# Patient Record
Sex: MALE | Race: WHITE | Employment: FULL TIME | URBAN - METROPOLITAN AREA
[De-identification: names, ages, dates, MRNs, and addresses within clinical notes are randomized per-mention and may not be internally consistent; named-entity substitution may affect disease eponyms.]

---

## 2017-12-14 ENCOUNTER — TELEPHONE (OUTPATIENT)
Dept: FAMILY MEDICINE | Facility: CLINIC | Age: 52
End: 2017-12-14

## 2017-12-14 NOTE — TELEPHONE ENCOUNTER
12/14/2017    Call Regarding Preventive Health Screening Colonoscopy    Attempt 1    Message on voicemail     Comments:       Outreach   MG

## 2018-01-12 NOTE — TELEPHONE ENCOUNTER
1/12/2018    Call Regarding Preventive Health Screening Colonoscopy    Attempt 2    Message on voicemail     Comments:       Outreach   SB

## 2018-02-10 NOTE — TELEPHONE ENCOUNTER
2/10/2018    Call Regarding Preventive Health Screening Colonoscopy    Attempt 3    Message on voicemail     Comments:       Outreach   SB

## 2018-06-15 ENCOUNTER — OFFICE VISIT (OUTPATIENT)
Dept: FAMILY MEDICINE | Facility: CLINIC | Age: 53
End: 2018-06-15
Payer: COMMERCIAL

## 2018-06-15 VITALS
HEIGHT: 70 IN | BODY MASS INDEX: 29.2 KG/M2 | SYSTOLIC BLOOD PRESSURE: 132 MMHG | RESPIRATION RATE: 14 BRPM | OXYGEN SATURATION: 98 % | DIASTOLIC BLOOD PRESSURE: 84 MMHG | HEART RATE: 76 BPM | WEIGHT: 204 LBS | TEMPERATURE: 98.7 F
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
## 2018-06-15 PROCEDURE — 99213 OFFICE O/P EST LOW 20 MIN: CPT | Performed by: FAMILY MEDICINE

## 2018-06-15 NOTE — MR AVS SNAPSHOT
"              After Visit Summary   6/15/2018    Lb Cevallos    MRN: [id number]           Patient Information     Date Of Birth          1965        Visit Information        Provider Department      6/15/2018 2:00 PM Noble Jarrett MD [facility] [place]        Today's Diagnoses     Bug bite, initial encounter    -  1       Follow-ups after your visit        Follow-up notes from your care team     Return if symptoms worsen or fail to improve.      Who to contact     If you have questions or need follow up information about today's clinic visit or your schedule please contact [facility] [place] directly at [phone].  Normal or non-critical lab and imaging results will be communicated to you by MyChart, letter or phone within 4 business days after the clinic has received the results. If you do not hear from us within 7 days, please contact the clinic through MyChart or phone. If you have a critical or abnormal lab result, we will notify you by phone as soon as possible.  Submit refill requests through [employer] or call your pharmacy and they will forward the refill request to us. Please allow 3 business days for your refill to be completed.          Additional Information About Your Visit        MyChart Information     [employer] gives you secure access to your electronic health record. If you see a primary care provider, you can also send messages to your care team and make appointments. If you have questions, please call your primary care clinic.  If you do not have a primary care provider, please call [phone] and they will assist you.        Care EveryWhere ID     This is your Care EveryWhere ID. This could be used by other organizations to access your [facility] medical records  [id number]        Your Vitals Were     Pulse Temperature Respirations Height Pulse Oximetry BMI (Body Mass Index)    76 98.7  F (37.1  C) (Tympanic) 14 5' 10\" (1.778 m) 98% 29.27 kg/m2       Blood " Pressure from Last 3 Encounters:   06/15/18 132/84   12/19/16 128/90   03/04/16 131/87    Weight from Last 3 Encounters:   06/15/18 204 lb (92.5 kg)   12/19/16 215 lb (97.5 kg)   03/04/16 206 lb (93.4 kg)              Today, you had the following     No orders found for display       Primary Care Provider Office Phone # Fax #    [facility] [phone]       [address] [facility] [address]        Equal Access to Services     PAUL GREENE : Hadii isaías hernandez hadasho Soomaali, waaxda luqadaha, qaybta kaalmada aderosemarieyahardik, zoraida chester . So [facility] [phone].    ATENCIÓN: Si habla español, tiene a vasquez disposición servicios gratuitos de asistencia lingüística. Llame al [phone].    We comply with applicable federal civil rights laws and [place] laws. We do not discriminate on the basis of race, color, national origin, age, disability, sex, sexual orientation, or gender identity.            Thank you!     Thank you for choosing [facility]  for your care. Our goal is always to provide you with excellent care. Hearing back from our patients is one way we can continue to improve our services. Please take a few minutes to complete the written survey that you may receive in the mail after your visit with us. Thank you!             Your Updated Medication List - Protect others around you: Learn how to safely use, store and throw away your medicines at www.disposemymeds.org.      Notice  As of 6/15/2018  2:30 PM    You have not been prescribed any medications.

## 2018-06-15 NOTE — Clinical Note
Colonoscopy done on this date: 9/1/2016 (approximately), by this group: [facility], results were normal.

## 2018-06-15 NOTE — PROGRESS NOTES
SUBJECTIVE:   Lb Cevallos is a 52 year old male who presents to clinic today for the following health issues:      Derm Problem       Duration: 6 days     Description  Location: abdomen   Itching: not currently, it was itchy at fist     Intensity:  mild    Accompanying signs and symptoms: None    History (similar episodes/previous evaluation): None    Precipitating or alleviating factors:  New exposures:  None  Recent travel: no      Therapies tried and outcome: none    Please abstract the following data from this visit with this patient into the appropriate field in Epic:    Colonoscopy done on this date: 9/1/2016 (approximately), by this group: [facility], results were normal.         Problem list and histories reviewed & adjusted, as indicated.  Additional history: as documented    Patient Active Problem List   Diagnosis     Family history of malignant neoplasm of prostate     Kidney donor     Hyperlipidemia LDL goal <130     Obesity (BMI 30-39.9)     High triglycerides     Elevated fasting glucose     Elevated hemoglobin (H)     Past Surgical History:   Procedure Laterality Date     HC REPAIR SLIDING INGUINAL HERNIA  2003,1983    Bilateral     HC VASECTOMY UNILAT/BILAT W POSTOP SEMEN  2000     SURGICAL HISTORY OF -   6/07    donated left kidney, done with laparoscopic surg ([place])       Social History   Substance Use Topics     Smoking status: Never Smoker     Smokeless tobacco: Never Used     Alcohol use 0.0 oz/week     0 Standard drinks or equivalent per week      Comment: 1-2 drinks per year     Family History   Problem Relation Age of Onset     Blood Disease Father      leukemia     Thyroid Disease Mother      Prostate Cancer Father      early 70s     Cancer - colorectal No family hx of      Breast Cancer No family hx of      C.A.D. Maternal Grandfather      ANDREZ MENDOZA     DIABETES No family hx of      CEREBROVASCULAR DISEASE No family hx of          No current outpatient prescriptions on file.  "    No Known Allergies  Recent Labs   Lab Test  05/07/15   1441  04/26/13   0914  10/04/12   1154  09/29/10   1208   09/29/10   1207   A1C   --   5.8   --   5.6   --    --    LDL   --   Cannot estimate LDL when triglyceride exceeds 400 mg/dL  126  Cannot estimate LDL when triglyceride exceeds 400 mg/dL  153*   --    --   138*   HDL   --   34*  36*   --    --   35*   TRIG   --   487*  464*   --    --   351*   ALT  44  49   --    --    --    --    CR  0.94  0.87  1.00   --    --   1.08   GFRESTIMATED  85  >90  80   --    --   74   GFRESTBLACK  >90   GFR Calc    >90  >90   --    --   89   POTASSIUM  4.3   --    --    --    --    --    TSH  1.33  2.72  2.41  3.22   < >   --     < > = values in this interval not displayed.      BP Readings from Last 3 Encounters:   06/15/18 132/84   12/19/16 128/90   03/04/16 131/87    Wt Readings from Last 3 Encounters:   06/15/18 204 lb (92.5 kg)   12/19/16 215 lb (97.5 kg)   03/04/16 206 lb (93.4 kg)                    Reviewed and updated as needed this visit by clinical staff       Reviewed and updated as needed this visit by Provider         ROS:  Constitutional, HEENT, cardiovascular, pulmonary, gi and gu systems are negative, except as otherwise noted.    OBJECTIVE:     /84 (Cuff Size: Adult Regular)  Pulse 76  Temp 98.7  F (37.1  C) (Tympanic)  Resp 14  Ht 5' 10\" (1.778 m)  Wt 204 lb (92.5 kg)  SpO2 98%  BMI 29.27 kg/m2  Body mass index is 29.27 kg/(m^2).  GENERAL: healthy, alert and no distress  NECK: no adenopathy, no asymmetry, masses, or scars and thyroid normal to palpation  RESP: lungs clear to auscultation - no rales, rhonchi or wheezes  CV: regular rate and rhythm, normal S1 S2, no S3 or S4, no murmur, click or rub, no peripheral edema and peripheral pulses strong  ABDOMEN: soft, nontender, no hepatosplenomegaly, no masses and bowel sounds normal  MS: no gross musculoskeletal defects noted, no edema        ASSESSMENT/PLAN:   Lb was seen " today for derm problem.    Diagnoses and all orders for this visit:    Bug bite, initial encounter      Skin rash on abd, has no target sign, has no flu like sx  Will keep monitoring closely and consider Lyme screening test if sx starts mentioned above       Noble Jarrett MD  [facility] [place]

## 2019-10-25 ENCOUNTER — ANCILLARY PROCEDURE (OUTPATIENT)
Dept: GENERAL RADIOLOGY | Facility: CLINIC | Age: 54
End: 2019-10-25
Attending: FAMILY MEDICINE
Payer: COMMERCIAL

## 2019-10-25 ENCOUNTER — OFFICE VISIT (OUTPATIENT)
Dept: FAMILY MEDICINE | Facility: CLINIC | Age: 54
End: 2019-10-25
Payer: COMMERCIAL

## 2019-10-25 VITALS
DIASTOLIC BLOOD PRESSURE: 90 MMHG | OXYGEN SATURATION: 98 % | SYSTOLIC BLOOD PRESSURE: 130 MMHG | BODY MASS INDEX: 28.2 KG/M2 | HEIGHT: 70 IN | TEMPERATURE: 98.2 F | HEART RATE: 77 BPM | WEIGHT: 197 LBS

## 2019-10-25 DIAGNOSIS — Z23 NEED FOR PROPHYLACTIC VACCINATION AND INOCULATION AGAINST INFLUENZA: Primary | ICD-10-CM

## 2019-10-25 DIAGNOSIS — K92.89 GAS BLOAT SYNDROME: ICD-10-CM

## 2019-10-25 LAB
ALBUMIN UR-MCNC: 30 MG/DL
APPEARANCE UR: CLEAR
BACTERIA #/AREA URNS HPF: ABNORMAL /HPF
BILIRUB UR QL STRIP: NEGATIVE
COLOR UR AUTO: YELLOW
ERYTHROCYTE [DISTWIDTH] IN BLOOD BY AUTOMATED COUNT: 12.6 % (ref 10–15)
GLUCOSE UR STRIP-MCNC: NEGATIVE MG/DL
HCT VFR BLD AUTO: 51.2 % (ref 40–53)
HGB BLD-MCNC: 17.4 G/DL (ref 13.3–17.7)
HGB UR QL STRIP: NEGATIVE
KETONES UR STRIP-MCNC: NEGATIVE MG/DL
LEUKOCYTE ESTERASE UR QL STRIP: NEGATIVE
LIPASE SERPL-CCNC: 154 U/L (ref 73–393)
MCH RBC QN AUTO: 29.8 PG (ref 26.5–33)
MCHC RBC AUTO-ENTMCNC: 34 G/DL (ref 31.5–36.5)
MCV RBC AUTO: 88 FL (ref 78–100)
NITRATE UR QL: NEGATIVE
NON-SQ EPI CELLS #/AREA URNS LPF: ABNORMAL /LPF
PH UR STRIP: 5.5 PH (ref 5–7)
PLATELET # BLD AUTO: 240 10E9/L (ref 150–450)
RBC # BLD AUTO: 5.84 10E12/L (ref 4.4–5.9)
RBC #/AREA URNS AUTO: ABNORMAL /HPF
SOURCE: ABNORMAL
SP GR UR STRIP: 1.02 (ref 1–1.03)
UROBILINOGEN UR STRIP-ACNC: 0.2 EU/DL (ref 0.2–1)
WBC # BLD AUTO: 13.3 10E9/L (ref 4–11)
WBC #/AREA URNS AUTO: ABNORMAL /HPF

## 2019-10-25 PROCEDURE — 74019 RADEX ABDOMEN 2 VIEWS: CPT | Mod: FY

## 2019-10-25 PROCEDURE — 84443 ASSAY THYROID STIM HORMONE: CPT | Performed by: FAMILY MEDICINE

## 2019-10-25 PROCEDURE — 36415 COLL VENOUS BLD VENIPUNCTURE: CPT | Performed by: FAMILY MEDICINE

## 2019-10-25 PROCEDURE — 90471 IMMUNIZATION ADMIN: CPT | Performed by: FAMILY MEDICINE

## 2019-10-25 PROCEDURE — 85027 COMPLETE CBC AUTOMATED: CPT | Performed by: FAMILY MEDICINE

## 2019-10-25 PROCEDURE — 90682 RIV4 VACC RECOMBINANT DNA IM: CPT | Performed by: FAMILY MEDICINE

## 2019-10-25 PROCEDURE — 80053 COMPREHEN METABOLIC PANEL: CPT | Performed by: FAMILY MEDICINE

## 2019-10-25 PROCEDURE — 83690 ASSAY OF LIPASE: CPT | Performed by: FAMILY MEDICINE

## 2019-10-25 PROCEDURE — 99214 OFFICE O/P EST MOD 30 MIN: CPT | Mod: 25 | Performed by: FAMILY MEDICINE

## 2019-10-25 PROCEDURE — 81001 URINALYSIS AUTO W/SCOPE: CPT | Performed by: FAMILY MEDICINE

## 2019-10-25 ASSESSMENT — MIFFLIN-ST. JEOR: SCORE: 1739.84

## 2019-10-25 NOTE — PROGRESS NOTES
"Subjective     Lb Cevallos is a 54 year old male who presents to clinic today for the following health issues:    HPI   Patient came today to complains of some abdominal distention and possible gas pain.  Apparently he was eating normal and all of a sudden he noticed increase gas and burping sensation.  He is able to pass gas he had a bowel movement this morning.  Denies any diarrhea no fever no chills.  No recent hospitalization.  He is not sure if he has changed any of his dietary habits.  Apparently denies any constipation.    Denies any recent increase alcohol intake.  No nausea or vomiting.  No urinary symptoms.  No focal tenderness.  Onset: a week ago     Description:     Burning in chest: YES    Intensity: severe    Progression of Symptoms: intermittent    Accompanying Signs & Symptoms:  Does it feel like food gets stuck: no  Nausea: no  Vomiting (bloody?): no  Abdominal Pain: YES  Black-Tarry stools: no:  Bloody stools: no    History:   Previous ulcers: no    Precipitating factors:   Caffeine use: YES- until last Thursday   Alcohol use: no  NSAID/Aspirin use: YES  Tobacco use: no  Worse with fatty foods and notes improvement with bland foods .    Alleviating factors:  Nothing     Therapies Tried and outcome:lifestyle change including: avoiding fatty, spicy, caffeinated and alcohol and chewable antacid            Reviewed and updated as needed this visit by Provider         Review of Systems   ROS COMP: Constitutional, HEENT, cardiovascular, pulmonary, gi and gu systems are negative, except as otherwise noted.      Objective    BP (!) 130/90 (BP Location: Left arm, Patient Position: Chair, Cuff Size: Adult Regular)   Pulse 77   Temp 98.2  F (36.8  C) (Tympanic)   Ht 1.778 m (5' 10\")   Wt 89.4 kg (197 lb)   SpO2 98%   BMI 28.27 kg/m    Body mass index is 28.27 kg/m .  Physical Exam   GENERAL: healthy, alert and no distress  EYES: Eyes grossly normal to inspection, PERRL and conjunctivae and " "sclerae normal  HENT: ear canals and TM's normal, nose and mouth without ulcers or lesions  NECK: no adenopathy, no asymmetry, masses, or scars and thyroid normal to palpation  RESP: lungs clear to auscultation - no rales, rhonchi or wheezes  CV: regular rate and rhythm, normal S1 S2, no S3 or S4, no murmur, click or rub, no peripheral edema and peripheral pulses strong  ABDOMEN: soft, nontender, no hepatosplenomegaly, no masses and bowel sounds normal  MS: no gross musculoskeletal defects noted, no edema  SKIN: no suspicious lesions or rashes            Assessment & Plan     1. Need for prophylactic vaccination and inoculation against influenza    - INFLUENZA QUAD, RECOMBINANT, P-FREE (RIV4) (FLUBLOCK) [81516]  - Vaccine Administration, Initial [38036]    2. Gas bloat syndrome  Patient has feeling of bloatedness in the stomach without any focal tenderness.  X-ray done which indicates mild to moderate stool which could be the cause of it.  Mild obstipation/constipation can be the reason.  I have ordered some other blood test to follow-up on that.  Meanwhile he is advised to see if his symptoms improve conservatively.  Eating healthy.  He can try Gas-X over-the-counter to see if that helps with his discomfort.  If symptoms does not improve the next 3 to 4 days or any worsening or any focal tenderness I suggested him that we should need to do CT scan for further evaluation patient understand and let us know if the symptoms are not improving.  - XR Abdomen 2 Views; Future  - Comprehensive metabolic panel  - CBC with platelets  - Lipase  - UA reflex to Microscopic  - TSH     BMI:   Estimated body mass index is 28.27 kg/m  as calculated from the following:    Height as of this encounter: 1.778 m (5' 10\").    Weight as of this encounter: 89.4 kg (197 lb).     Damián Parks MD  [facility] [place]      "

## 2019-10-26 LAB
ALBUMIN SERPL-MCNC: 3.9 G/DL (ref 3.4–5)
ALP SERPL-CCNC: 91 U/L (ref 40–150)
ALT SERPL W P-5'-P-CCNC: 39 U/L (ref 0–70)
ANION GAP SERPL CALCULATED.3IONS-SCNC: 6 MMOL/L (ref 3–14)
AST SERPL W P-5'-P-CCNC: 13 U/L (ref 0–45)
BILIRUB SERPL-MCNC: 0.9 MG/DL (ref 0.2–1.3)
BUN SERPL-MCNC: 13 MG/DL (ref 7–30)
CALCIUM SERPL-MCNC: 8.7 MG/DL (ref 8.5–10.1)
CHLORIDE SERPL-SCNC: 103 MMOL/L (ref 94–109)
CO2 SERPL-SCNC: 27 MMOL/L (ref 20–32)
CREAT SERPL-MCNC: 0.94 MG/DL (ref 0.66–1.25)
GFR SERPL CREATININE-BSD FRML MDRD: >90 ML/MIN/{1.73_M2}
GLUCOSE SERPL-MCNC: 101 MG/DL (ref 70–99)
POTASSIUM SERPL-SCNC: 4.1 MMOL/L (ref 3.4–5.3)
PROT SERPL-MCNC: 7.6 G/DL (ref 6.8–8.8)
SODIUM SERPL-SCNC: 136 MMOL/L (ref 133–144)
TSH SERPL DL<=0.005 MIU/L-ACNC: 1.5 MU/L (ref 0.4–4)

## 2019-10-28 ENCOUNTER — OFFICE VISIT (OUTPATIENT)
Dept: FAMILY MEDICINE | Facility: CLINIC | Age: 54
End: 2019-10-28
Payer: COMMERCIAL

## 2019-10-28 ENCOUNTER — HOSPITAL ENCOUNTER (OUTPATIENT)
Dept: CT IMAGING | Facility: CLINIC | Age: 54
Discharge: HOME | End: 2019-10-28
Attending: FAMILY MEDICINE | Admitting: FAMILY MEDICINE
Payer: COMMERCIAL

## 2019-10-28 VITALS
DIASTOLIC BLOOD PRESSURE: 84 MMHG | HEART RATE: 88 BPM | BODY MASS INDEX: 28.12 KG/M2 | TEMPERATURE: 96.1 F | OXYGEN SATURATION: 97 % | SYSTOLIC BLOOD PRESSURE: 124 MMHG | WEIGHT: 196 LBS

## 2019-10-28 DIAGNOSIS — K81.9 CHOLECYSTITIS: ICD-10-CM

## 2019-10-28 DIAGNOSIS — R10.84 ABDOMINAL PAIN, GENERALIZED: ICD-10-CM

## 2019-10-28 DIAGNOSIS — R10.84 ABDOMINAL PAIN, GENERALIZED: Primary | ICD-10-CM

## 2019-10-28 LAB
ERYTHROCYTE [DISTWIDTH] IN BLOOD BY AUTOMATED COUNT: 12.4 % (ref 10–15)
HCT VFR BLD AUTO: 49.2 % (ref 40–53)
HGB BLD-MCNC: 16.8 G/DL (ref 13.3–17.7)
MCH RBC QN AUTO: 29.7 PG (ref 26.5–33)
MCHC RBC AUTO-ENTMCNC: 34.1 G/DL (ref 31.5–36.5)
MCV RBC AUTO: 87 FL (ref 78–100)
PLATELET # BLD AUTO: 242 10E9/L (ref 150–450)
RBC # BLD AUTO: 5.65 10E12/L (ref 4.4–5.9)
WBC # BLD AUTO: 11.6 10E9/L (ref 4–11)

## 2019-10-28 PROCEDURE — 25000128 H RX IP 250 OP 636: Performed by: FAMILY MEDICINE

## 2019-10-28 PROCEDURE — 74177 CT ABD & PELVIS W/CONTRAST: CPT

## 2019-10-28 PROCEDURE — 99214 OFFICE O/P EST MOD 30 MIN: CPT | Performed by: FAMILY MEDICINE

## 2019-10-28 PROCEDURE — 25000125 ZZHC RX 250: Performed by: FAMILY MEDICINE

## 2019-10-28 PROCEDURE — 36415 COLL VENOUS BLD VENIPUNCTURE: CPT | Performed by: FAMILY MEDICINE

## 2019-10-28 PROCEDURE — 85027 COMPLETE CBC AUTOMATED: CPT | Performed by: FAMILY MEDICINE

## 2019-10-28 RX ORDER — IOPAMIDOL 755 MG/ML
96 INJECTION, SOLUTION INTRAVASCULAR ONCE
Status: COMPLETED | OUTPATIENT
Start: 2019-10-28 | End: 2019-10-28

## 2019-10-28 RX ADMIN — SODIUM CHLORIDE 67 ML: 9 INJECTION, SOLUTION INTRAVENOUS at 14:33

## 2019-10-28 RX ADMIN — IOPAMIDOL 96 ML: 755 INJECTION, SOLUTION INTRAVENOUS at 14:33

## 2019-10-28 NOTE — PATIENT INSTRUCTIONS
[place] [phone]      Check in TODAY at 2:30pm     Nothing to eat or drink for 2 hours prior

## 2019-10-28 NOTE — PROGRESS NOTES
Subjective     Lb Cevallos is a 54 year old male who presents to clinic today for the following health issues:    HPI   ABDOMINAL   PAIN     Onset: 10 days - worsening    Description:   Character: Bloating/Gas/Sharp - worse after eatting    Intensity: moderate    Progression of Symptoms:  worsening    Accompanying Signs & Symptoms:  Fever/Chills?: no   Gas/Bloating: YES  Nausea: YES  Vomitting: no   Diarrhea?: no   Constipation:no   Dysuria or Hematuria: no    History:   Trauma: no     Precipitating factors:   Does the pain change with:     Food: YES- eatting seems to cause the pain     BM: no     Urination: no     Therapies Tried and outcome: Miralax    LMP:  not applicable   Patient was evaluated 2 days ago secondary to some discomfort which was not on specific.  His x-ray was done which indicate mild to moderate constipation.  He has tried MiraLAX but his stools are still very hard and caliber is not changing.  Other than that it was noted some mild elevation of WBCs which are trending down.  He complains of discomfort in the mid abdomen sometimes in the epigastric area.  Feeling of abdominal distention.          Reviewed and updated as needed this visit by Provider         Review of Systems   ROS COMP: Constitutional, HEENT, cardiovascular, pulmonary, gi and gu systems are negative, except as otherwise noted.      Objective    /84   Pulse 88   Temp 96.1  F (35.6  C) (Tympanic)   Wt 88.9 kg (196 lb)   SpO2 97%   BMI 28.12 kg/m    Body mass index is 28.12 kg/m .  Physical Exam   GENERAL: healthy, alert and no distress  NECK: no adenopathy, no asymmetry, masses, or scars and thyroid normal to palpation  RESP: lungs clear to auscultation - no rales, rhonchi or wheezes  CV: regular rate and rhythm, normal S1 S2, no S3 or S4, no murmur, click or rub, no peripheral edema and peripheral pulses strong  ABDOMEN: soft, nontender, no hepatosplenomegaly, bowel sounds are present.  No CVA tenderness       "    Assessment & Plan     1. Abdominal pain, generalized  Patient complete blood count repeated which is trending down and he had upper side of the normal.  Due to ongoing discomfort and change in caliber I would suggest further evaluation with CT scan.  Patient had colonoscopy done almost 3 to 4 years ago it was normal.  Once CT scan is done we will make further recommendation.  Differential may include diverticulitis versus moderate to severe constipation versus other etiologies which needs to be evaluated.  - CBC with platelets  - CT Abdomen Pelvis w Contrast; Future     BMI:   Estimated body mass index is 28.12 kg/m  as calculated from the following:    Height as of 10/25/19: 1.778 m (5' 10\").    Weight as of this encounter: 88.9 kg (196 lb).     Damián Parks MD  [facility] [place]      "

## 2019-10-29 ENCOUNTER — HOSPITAL ENCOUNTER (OUTPATIENT)
Dept: ULTRASOUND IMAGING | Facility: CLINIC | Age: 54
Discharge: HOME | End: 2019-10-29
Attending: FAMILY MEDICINE | Admitting: FAMILY MEDICINE
Payer: COMMERCIAL

## 2019-10-29 ENCOUNTER — TELEPHONE (OUTPATIENT)
Dept: FAMILY MEDICINE | Facility: CLINIC | Age: 54
End: 2019-10-29

## 2019-10-29 DIAGNOSIS — R10.84 ABDOMINAL PAIN, GENERALIZED: ICD-10-CM

## 2019-10-29 DIAGNOSIS — K81.9 CHOLECYSTITIS: ICD-10-CM

## 2019-10-29 PROCEDURE — 76700 US EXAM ABDOM COMPLETE: CPT

## 2019-10-29 RX ORDER — METRONIDAZOLE 500 MG/1
500 TABLET ORAL 3 TIMES DAILY
Qty: 21 TABLET | Refills: 0 | Status: SHIPPED | OUTPATIENT
Start: 2019-10-29 | End: 2019-11-06

## 2019-10-29 RX ORDER — CIPROFLOXACIN 500 MG/1
500 TABLET, FILM COATED ORAL 2 TIMES DAILY
Qty: 14 TABLET | Refills: 0 | Status: SHIPPED | OUTPATIENT
Start: 2019-10-29 | End: 2019-11-06

## 2019-10-29 NOTE — TELEPHONE ENCOUNTER
Patient wife was notified with information noted by provider and agreed with plan.  HENRI SmithN, RN  [employer] [facility]

## 2019-10-29 NOTE — TELEPHONE ENCOUNTER
Patient returned call to provider.  Provider was unavailable and patient stated he is still at work and will be going into an area where he may not have cellular coverage and requests provider call the home number and speak with wife Mey to relay results: [phone].    Routing to provider as high priority.    HENRI GordonN, RN  [employer] [facility]

## 2019-10-29 NOTE — TELEPHONE ENCOUNTER
Please call the wife and notify her .  His ultrasound report indicate he has some inflammation around the gallbladder.  Which may be causing some discomfort.  He can start the antibiotic which were given to him.  I have also placed a referral for general surgery to have a look at it he can call them and set up an appointment in the next few days to see if any further treatment is needed.    Referral to general surgery is already please please give the number to them so that they can call

## 2019-10-30 ENCOUNTER — NURSE TRIAGE (OUTPATIENT)
Dept: FAMILY MEDICINE | Facility: CLINIC | Age: 54
End: 2019-10-30

## 2019-10-30 NOTE — TELEPHONE ENCOUNTER
I would suggest patient if he feel any dizzy or any pain with bowel movement or any more black stools he should be seen in the ER.  He had a CT scan which did not indicate any obvious abnormality other than gallbladder.

## 2019-10-30 NOTE — TELEPHONE ENCOUNTER
Attempted to call patient, left a detailed message (per note below okay to do so) with Dr. Parks's response below via voicemail. Advised via voicemail if patient is having those symptoms mentioned in Dr. Parks's response or any more black stools to go to ED, otherwise continue to monitor and update the clinic with how patient is feeling.       Daina Loomis RN, BSN  [facility] [place]

## 2019-10-30 NOTE — TELEPHONE ENCOUNTER
Patient is calling today, Patient was seen on 10/28/19 with Dr. Parks for abdominal pain and cholecystitis. Patient had an x-ray done 2 days prior when he was seen on 10/25/19 and it showed moderate constipation which patient was given Miralax.    Patient finally had a bowel movement that was loose/watery yesterday and color was normal per patient statement (brown). Patient stopped the Miralax today after he had a bowel movement that was loose and black colored  (per patient statement). Patient stated there was no blood or blood clots noted in his stool and did not look tarry, but seemed to look darker than brown more black.     When patient was seen on 10/28/19 Dr. Parks suggested a CT for further evaluation. CT came back and Dr. Parks wanted to further evaluate with an abdominal ultrasound. The ultrasound showed some inflammation around the gall bladder, patient was given antibiotics, which he started yesterday and was told to follow up with general surgery (which patient scheduled for next week Tuesday).    Patient denies abdominal pain, fever, blood or blood clots in stool (red), dizziness, weakness, dehydration (patient has been drinking fluids).    Patient wants Dr. Parks's recommendations, should he continue to monitor and see if he has another stool that is dark (black ) colored, or should be be seen in the ED for this. Patient would like to know Dr. Parks's recommendations before going to the ED.    Routing to PCP to advise. Thank you.    Okay to leave detailed message? YES      Additional Information    Negative: Blood in stool and without diarrhea    Negative: Vomiting also present and worse than the diarrhea    Negative: Sounds like a life-threatening emergency to the triager    Negative: Difficult to awaken or acting confused (e.g., disoriented, slurred speech)    Negative: Shock suspected (e.g., cold/pale/clammy skin, too weak to stand, low BP, rapid pulse)    Negative: SEVERE abdominal pain (e.g.,  "excruciating) and present > 1 hour    Negative: SEVERE abdominal pain and age > 60    Negative: SEVERE diarrhea (e.g., 7 or more times / day more than normal) and age > 60 years    Negative: Constant abdominal pain lasting > 2 hours    Negative: Drinking very little and has signs of dehydration (e.g., no urine > 12 hours, very dry mouth, very lightheaded)    Negative: Patient sounds very sick or weak to the triager    Negative: Bloody, black, or tarry bowel movements     Not taking any iron pills or pepto bismol    Negative: SEVERE diarrhea (e.g., 7 or more times / day more than normal) and present > 24 hours (1 day)    Negative: MODERATE diarrhea (e.g., 4-6 times / day more than normal) and present > 48 hours (2 days)    Negative: MODERATE diarrhea (e.g., 4-6 times / day more than normal) and age > 70 years    Negative: Abdominal pain  (Exception: pain clears completely with each passage of diarrhea stool)    Negative: Fever > 101 F (38.3 C)    Negative: Blood in the stool    Negative: Mucus or pus in stool has been present > 2 days and diarrhea is more than mild    Negative: Weak immune system (e.g., HIV positive, cancer chemo, splenectomy, organ transplant, chronic steroids)    Answer Assessment - Initial Assessment Questions  1. DIARRHEA SEVERITY: \"How bad is the diarrhea?\" \"How many extra stools have you had in the past 24 hours than normal?\"     - MILD: Few loose or mushy BMs; increase of 1-3 stools over normal daily number of stools; mild increase in ostomy output.    - MODERATE: Increase of 4-6 stools daily over normal; moderate increase in ostomy output.    - SEVERE (or Worst Possible): Increase of 7 or more stools daily over normal; moderate increase in ostomy output; incontinence.      Mild diarrhea.  2. ONSET: \"When did the diarrhea begin?\"       Yesterday patient had bowel movement around 5 pm. That bowel movement was normal colored, but patient had diarrhea. Bowel movement today around 1 pm and this " "was colored black. Patient unsure if it is tarry looking, no blood clots or blood noticed in stool.   3. BM CONSISTENCY: \"How loose or watery is the diarrhea?\"       Watery Diarrhea yesterday, more loose diarrhea today.   4. VOMITING: \"Are you also vomiting?\" If so, ask: \"How many times in the past 24 hours?\"       No vomiting.   5. ABDOMINAL PAIN: \"Are you having any abdominal pain?\" If yes: \"What does it feel like?\" (e.g., crampy, dull, intermittent, constant)       No abdominal pain.   6. ABDOMINAL PAIN SEVERITY: If present, ask: \"How bad is the pain?\"  (e.g., Scale 1-10; mild, moderate, or severe)     - MILD (1-3): doesn't interfere with normal activities, abdomen soft and not tender to touch      - MODERATE (4-7): interferes with normal activities or awakens from sleep, tender to touch      - SEVERE (8-10): excruciating pain, doubled over, unable to do any normal activities       NONE  7. ORAL INTAKE: If vomiting, \"Have you been able to drink liquids?\" \"How much fluids have you had in the past 24 hours?\"      Patient has been staying hydrated.   8. HYDRATION: \"Any signs of dehydration?\" (e.g., dry mouth [not just dry lips], too weak to stand, dizziness, new weight loss) \"When did you last urinate?\"      Denies dizziness, patient is tired at times. Patient has urinated today.   9. EXPOSURE: \"Have you traveled to a foreign country recently?\" \"Have you been exposed to anyone with diarrhea?\" \"Could you have eaten any food that was spoiled?\"      NO  10. ANTIBIOTIC USE: \"Are you taking antibiotics now or have you taken antibiotics in the past 2 months?\"        Antibiotics yesterday, CIPRO, Flagyl.   11. OTHER SYMPTOMS: \"Do you have any other symptoms?\" (e.g., fever, blood in stool)        No blood in stool, no fevers.   12. PREGNANCY: \"Is there any chance you are pregnant?\" \"When was your last menstrual period?\"      NONE.    Protocols used: DIARRHEA-A-OH    Daina Loomis RN, BSN  [facility] [place]

## 2019-10-30 NOTE — TELEPHONE ENCOUNTER
Reason for Call:  Update on PT     Detailed comments: Pt had a BM Movement yesterday that was  Diarrhea, today aother diarrhea that was Black. He is a little concerned. Pt would like to have a nurse call him back to talk about.     Phone Number Patient can be reached at: Cell number on file:    Telephone Information:   Mobile [phone]       Best Time: any    Can we leave a detailed message on this number? Yes    Call taken on 10/30/2019 at 2:03 PM by Patt Campa

## 2019-11-05 ENCOUNTER — OFFICE VISIT (OUTPATIENT)
Dept: SURGERY | Facility: CLINIC | Age: 54
End: 2019-11-05
Payer: COMMERCIAL

## 2019-11-05 VITALS
HEART RATE: 78 BPM | SYSTOLIC BLOOD PRESSURE: 120 MMHG | WEIGHT: 189 LBS | HEIGHT: 70 IN | DIASTOLIC BLOOD PRESSURE: 78 MMHG | BODY MASS INDEX: 27.06 KG/M2

## 2019-11-05 DIAGNOSIS — K81.9 CHOLECYSTITIS: Primary | ICD-10-CM

## 2019-11-05 PROCEDURE — 99204 OFFICE O/P NEW MOD 45 MIN: CPT | Performed by: SURGERY

## 2019-11-05 ASSESSMENT — MIFFLIN-ST. JEOR: SCORE: 1703.55

## 2019-11-06 ENCOUNTER — OFFICE VISIT (OUTPATIENT)
Dept: FAMILY MEDICINE | Facility: CLINIC | Age: 54
End: 2019-11-06
Payer: COMMERCIAL

## 2019-11-06 ENCOUNTER — TELEPHONE (OUTPATIENT)
Dept: SURGERY | Facility: CLINIC | Age: 54
End: 2019-11-06

## 2019-11-06 VITALS
HEART RATE: 80 BPM | OXYGEN SATURATION: 95 % | SYSTOLIC BLOOD PRESSURE: 124 MMHG | TEMPERATURE: 96.9 F | BODY MASS INDEX: 27.69 KG/M2 | WEIGHT: 193 LBS | DIASTOLIC BLOOD PRESSURE: 70 MMHG

## 2019-11-06 DIAGNOSIS — Z52.4 KIDNEY DONOR: ICD-10-CM

## 2019-11-06 DIAGNOSIS — Z01.818 PREOP GENERAL PHYSICAL EXAM: Primary | ICD-10-CM

## 2019-11-06 DIAGNOSIS — K81.9 CHOLECYSTITIS: ICD-10-CM

## 2019-11-06 PROCEDURE — 99214 OFFICE O/P EST MOD 30 MIN: CPT | Performed by: FAMILY MEDICINE

## 2019-11-06 NOTE — TELEPHONE ENCOUNTER
Type of surgery: laparoscopic cholecystectomy, open left inguinal hernia repair  Location of surgery: [facility]  Date and time of surgery: 11/14/19 12:35pm  Surgeon: Dr Hernandez  Pre-Op Appt Date: pt to schedule  Post-Op Appt Date: pt to schedule   Packet sent out: Yes  Pre-cert/Authorization completed:  Not Applicable  Date: 11/5/19    General anesthesia

## 2019-11-06 NOTE — PROGRESS NOTES
"[facility]  Surgery Consultation      HPI: Patient is a 54 year old male who is here for consultation requested by Damián Parks [phone] for evaluation of Right upper quadrant pain.The patient describes having symptoms for the last 3 weeks. The pain is mainly located in the epigastric and  RUQ area. The patient rates the pain a 5 out of 10 when symptoms are present.The pain is exacerbated by oral intake. The pain is relieved by fasting.  He went to an ER after a severe episode.  A CT and ultrasound were obtained which showed a distended gallbladder with surrounding inflammation.  No significant stones were seen.  Since that time he has not had any significant oral intake due to having increased symptoms.  His symptoms return with any larger meals at this point.  He was also found to have a left inguinal hernia that he was unaware of at the time.  On reflection he does note symptoms from the hernia.  Patient denies fevers, chills, vomiting, jaundice, changes in stool or urine, headache, SOB, chest pain.    PMH:   has a past medical history of Hyperlipidemia LDL goal <130 (3/2/2011), Hypertrophy (benign) of prostate, Kidney donor (6/07), and Overweight(278.02) (9/29/2010).  PSH:    has a past surgical history that includes VASECTOMY UNILAT/BILAT W POSTOP SEMEN (2000); REPAIR SLIDING INGUINAL HERNIA (2003,1983); and surgical history of -  (6/07).  Social History:   reports that he has never smoked. He has never used smokeless tobacco. He reports current alcohol use. He reports that he does not use drugs.  Family History:   family history includes Blood Disease in his father; C.A.D. in his maternal grandfather; Prostate Cancer in his father; Thyroid Disease in his mother.  Medications/Allergies: Home medications and allergies reviewed.    ROS:  The 10 point Review of Systems is negative other than noted in the HPI.    Physical Exam:  /78   Pulse 78   Ht 1.778 m (5' 10\")   Wt 85.7 kg (189 " lb)   BMI 27.12 kg/m    GENERAL: Generally appears well.  Psych: Alert and Oriented.  Normal affect  Eyes: Sclera clear  Respiratory:  Lungs with good air excursion  Cardiovascular:  Normal peripheral pulses  GI: Abdomen Soft Mild tenderness to palpation RUQ No hernias palpated.  Groin-right-surgical incision seen.  No hernia palpated.  Left-moderate size inguinal hernia easily reducible.  No tenderness.  No testicle or scrotal abnormalities.  Lymphatic/Hematologic/Immune:  No obvious lymphadenopathy.  Integumentary:  No rashes  Neurological: grossly intact    All new lab and imaging data was reviewed.     Impression and Plan:  Patient is a 54 year old male with right upper quadrant pain and left inguinal hernia    PLAN:   I discussed the pathophysiology of gallbladder disease and complications of cholecystitis and choledocholithiasis with the patient.  I have reviewed all of his imaging studies that show a distended gallbladder with surrounding inflammation.   There were no stones seen but I feel his symptoms are likely related to the gallbladder and he would benefit from choelcystectomy.  We also discussed further work-up which could include an EGD, HIDA scan, or repeat ultrasound.  He does not want to pursue any further work-up and wants to go forward with surgery but understands that his symptoms may not improve and he would need further work-up after that.  He also has a symptomatic left inguinal hernia that will be repaired at the same time as long as there are no signs of significant infection.  We will plan to perform an open repair with mesh.  I also discussed the risks associated with the procedure including, but not limited to infection, bleeding, conversion to open, bile leak, bile duct injury, retained gallstones, pneumonia, MI, and anesthesia complications with the patient.  I also discussed if a complication did occur it may require further surgical intervention during or after the procedure. The  patient indicated understanding of the discussion, asked appropriate questions, and provided consent. I provided the patient an information pamphlet. I have recommended a low fat diet and instructed the patient to go to ER if they developed persistent pain, persistent nausea and vomiting, or yellowness of skin.      Thank you very much for this consult.    Ponce Hernandez M.D.  [facility]  [phone]    Please route or send letter to:  Primary Care Provider (PCP) and Referring Provider

## 2019-11-06 NOTE — PROGRESS NOTES
[facility]  [address] [address]  [phone]  Dept: [phone]    PRE-OP EVALUATION:  Today's date: 2019    Lb Cevallos (: 1965) presents for pre-operative evaluation assessment as requested by Dr. Hernandez.  He requires evaluation and anesthesia risk assessment prior to undergoing surgery/procedure for treatment of Cholecystitis.    Proposed Surgery/ Procedure: CHOLECYSTECTOMY, LAPAROSCOPIC.  HERNIORRHAPHY, INGUINAL, OPEN  Date of Surgery/ Procedure: 19  Time of Surgery/ Procedure: 12:30pm  Hospital/Surgical Facility: [facility]    Primary Physician: Damián Parks  Type of Anesthesia Anticipated: to be determined    Patient has a Health Care Directive or Living Will:  NO    1. NO - Do you have a history of heart attack, stroke, stent, bypass or surgery on an artery in the head, neck, heart or legs?  2. NO - Do you ever have any pain or discomfort in your chest?  3. NO - Do you have a history of  Heart Failure?  4. NO - Are you troubled by shortness of breath when: walking on the level, up a slight hill or at night?  5. NO - Do you currently have a cold, bronchitis or other respiratory infection?  6. NO - Do you have a cough, shortness of breath or wheezing?  7. NO - Do you sometimes get pains in the calves of your legs when you walk?  8. NO - Do you or anyone in your family have previous history of blood clots?  9. NO - Do you or does anyone in your family have a serious bleeding problem such as prolonged bleeding following surgeries or cuts?  10. NO - Have you ever had problems with anemia or been told to take iron pills?  11. NO - Have you had any abnormal blood loss such as black, tarry or bloody stools, or abnormal vaginal bleeding?  12. NO - Have you ever had a blood transfusion?  13. NO - Have you or any of your relatives ever had problems with anesthesia?  14. NO - Do you have sleep apnea, excessive snoring or daytime drowsiness?  15.  NO - Do you have any prosthetic heart valves?  16. NO - Do you have prosthetic joints?  17. NO - Is there any chance that you may be pregnant?      HPI:     HPI related to upcoming procedure:       See problem list for active medical problems.  Problems all longstanding and stable, except as noted/documented.  See ROS for pertinent symptoms related to these conditions.      MEDICAL HISTORY:     Patient Active Problem List    Diagnosis Date Noted     Obesity (BMI 30-39.9) 10/06/2012     Priority: High     Kidney donor      Priority: High     left       Cholecystitis 11/05/2019     Priority: Medium     Added automatically from request for surgery [id number]       High triglycerides 10/07/2012     Priority: Medium     Elevated fasting glucose 10/07/2012     Priority: Medium     Elevated hemoglobin (H) 10/07/2012     Priority: Medium     Hyperlipidemia LDL goal <130 03/02/2011     Priority: Medium     Family history of malignant neoplasm of prostate 02/23/2007     Priority: Medium      Past Medical History:   Diagnosis Date     Hyperlipidemia LDL goal <130 3/2/2011     Hypertrophy (benign) of prostate     mild, evaluated by UROLOGY     Kidney donor 6/07    left     Overweight(278.02) 9/29/2010     Past Surgical History:   Procedure Laterality Date     HC REPAIR SLIDING INGUINAL HERNIA  2003,1983    Bilateral     HC VASECTOMY UNILAT/BILAT W POSTOP SEMEN  2000     SURGICAL HISTORY OF -   6/07    donated left kidney, done with laparoscopic surg ([place])     No current outpatient medications on file.     OTC products: None, except as noted above    No Known Allergies   Latex Allergy: NO    Social History     Tobacco Use     Smoking status: Never Smoker     Smokeless tobacco: Never Used   Substance Use Topics     Alcohol use: Yes     Alcohol/week: 0.0 standard drinks     Comment: 1-2 drinks per year     History   Drug Use No       REVIEW OF SYSTEMS:   CONSTITUTIONAL: NEGATIVE for fever, chills, change in  weight  INTEGUMENTARY/SKIN: NEGATIVE for worrisome rashes, moles or lesions  EYES: NEGATIVE for vision changes or irritation  ENT/MOUTH: NEGATIVE for ear, mouth and throat problems  RESP: NEGATIVE for significant cough or SOB  BREAST: NEGATIVE for masses, tenderness or discharge  CV: NEGATIVE for chest pain, palpitations or peripheral edema  GI: NEGATIVE for nausea, abdominal pain, heartburn, or change in bowel habits  : NEGATIVE for frequency, dysuria, or hematuria  MUSCULOSKELETAL: NEGATIVE for significant arthralgias or myalgia  NEURO: NEGATIVE for weakness, dizziness or paresthesias  ENDOCRINE: NEGATIVE for temperature intolerance, skin/hair changes  HEME: NEGATIVE for bleeding problems  PSYCHIATRIC: NEGATIVE for changes in mood or affect    EXAM:   /70   Pulse 80   Temp 96.9  F (36.1  C) (Tympanic)   Wt 87.5 kg (193 lb)   SpO2 95%   BMI 27.69 kg/m    GENERAL APPEARANCE: healthy, alert and no distress  HENT: ear canals and TM's normal and nose and mouth without ulcers or lesions  RESP: lungs clear to auscultation - no rales, rhonchi or wheezes  CV: regular rate and rhythm, normal S1 S2, no S3 or S4 and no murmur, click or rub   ABDOMEN: soft, nontender, no HSM or masses and bowel sounds normal  NEURO: Normal strength and tone, sensory exam grossly normal, mentation intact and speech normal    DIAGNOSTICS:       Recent Labs   Lab Test 10/28/19  1008 10/25/19  1508 05/07/15  1441  04/26/13  0914   HGB 16.8 17.4 16.6  --   --     240 191   < >  --    NA  --  136 142  --   --    POTASSIUM  --  4.1 4.3  --   --    CR  --  0.94 0.94  --  0.87   A1C  --   --   --   --  5.8    < > = values in this interval not displayed.        IMPRESSION:   Reason for surgery/procedure:   Diagnosis/reason for consult:     ICD-10-CM    1. Preop general physical exam Z01.818    2. Kidney donor Z52.4    3. Cholecystitis K81.9          The proposed surgical procedure is considered LOW risk.    REVISED CARDIAC RISK  INDEX  The patient has the following serious cardiovascular risks for perioperative complications such as (MI, PE, VFib and 3  AV Block):  No serious cardiac risks  INTERPRETATION: 0 risks: Class I (very low risk - 0.4% complication rate)    The patient has the following additional risks for perioperative complications:  No identified additional risks      ICD-10-CM    1. Preop general physical exam Z01.818    2. Kidney donor Z52.4    3. Cholecystitis K81.9        RECOMMENDATIONS:             APPROVAL GIVEN to proceed with proposed procedure, without further diagnostic evaluation       Signed Electronically by: Damián Parks MD    Copy of this evaluation report is provided to requesting physician.    [place] Preop Guidelines    Revised Cardiac Risk Index

## 2019-11-14 ENCOUNTER — HOSPITAL ENCOUNTER (OUTPATIENT)
Facility: CLINIC | Age: 54
Discharge: HOME | End: 2019-11-14
Attending: SURGERY | Admitting: SURGERY
Payer: COMMERCIAL

## 2019-11-14 ENCOUNTER — ANESTHESIA (OUTPATIENT)
Dept: SURGERY | Facility: CLINIC | Age: 54
End: 2019-11-14
Payer: COMMERCIAL

## 2019-11-14 ENCOUNTER — APPOINTMENT (OUTPATIENT)
Dept: SURGERY | Facility: PHYSICIAN GROUP | Age: 54
End: 2019-11-14
Payer: COMMERCIAL

## 2019-11-14 ENCOUNTER — ANESTHESIA EVENT (OUTPATIENT)
Dept: SURGERY | Facility: CLINIC | Age: 54
End: 2019-11-14
Payer: COMMERCIAL

## 2019-11-14 VITALS
BODY MASS INDEX: 28.46 KG/M2 | HEIGHT: 70 IN | WEIGHT: 198.8 LBS | TEMPERATURE: 98.8 F | HEART RATE: 91 BPM | DIASTOLIC BLOOD PRESSURE: 89 MMHG | SYSTOLIC BLOOD PRESSURE: 145 MMHG | RESPIRATION RATE: 16 BRPM | OXYGEN SATURATION: 95 %

## 2019-11-14 DIAGNOSIS — K81.9 CHOLECYSTITIS: Primary | ICD-10-CM

## 2019-11-14 DIAGNOSIS — K81.9 CHOLECYSTITIS: ICD-10-CM

## 2019-11-14 PROCEDURE — 47562 LAPAROSCOPIC CHOLECYSTECTOMY: CPT | Performed by: SURGERY

## 2019-11-14 PROCEDURE — 37000008 ZZH ANESTHESIA TECHNICAL FEE, 1ST 30 MIN: Performed by: SURGERY

## 2019-11-14 PROCEDURE — 25000125 ZZHC RX 250: Performed by: NURSE ANESTHETIST, CERTIFIED REGISTERED

## 2019-11-14 PROCEDURE — 71000027 ZZH RECOVERY PHASE 2 EACH 15 MINS: Performed by: SURGERY

## 2019-11-14 PROCEDURE — C1781 MESH (IMPLANTABLE): HCPCS | Performed by: SURGERY

## 2019-11-14 PROCEDURE — 25000132 ZZH RX MED GY IP 250 OP 250 PS 637: Performed by: STUDENT IN AN ORGANIZED HEALTH CARE EDUCATION/TRAINING PROGRAM

## 2019-11-14 PROCEDURE — 36000058 ZZH SURGERY LEVEL 3 EA 15 ADDTL MIN: Performed by: SURGERY

## 2019-11-14 PROCEDURE — 25000566 ZZH SEVOFLURANE, EA 15 MIN: Performed by: SURGERY

## 2019-11-14 PROCEDURE — 25000128 H RX IP 250 OP 636: Performed by: SURGERY

## 2019-11-14 PROCEDURE — 25000128 H RX IP 250 OP 636: Performed by: NURSE ANESTHETIST, CERTIFIED REGISTERED

## 2019-11-14 PROCEDURE — 25800030 ZZH RX IP 258 OP 636: Performed by: ANESTHESIOLOGY

## 2019-11-14 PROCEDURE — 25800030 ZZH RX IP 258 OP 636: Performed by: NURSE ANESTHETIST, CERTIFIED REGISTERED

## 2019-11-14 PROCEDURE — 25800025 ZZH RX 258: Performed by: SURGERY

## 2019-11-14 PROCEDURE — 71000013 ZZH RECOVERY PHASE 1 LEVEL 1 EA ADDTL HR: Performed by: SURGERY

## 2019-11-14 PROCEDURE — 25000125 ZZHC RX 250: Performed by: SURGERY

## 2019-11-14 PROCEDURE — 37000009 ZZH ANESTHESIA TECHNICAL FEE, EACH ADDTL 15 MIN: Performed by: SURGERY

## 2019-11-14 PROCEDURE — 36000056 ZZH SURGERY LEVEL 3 1ST 30 MIN: Performed by: SURGERY

## 2019-11-14 PROCEDURE — 25000128 H RX IP 250 OP 636: Performed by: REGISTERED NURSE

## 2019-11-14 PROCEDURE — 25000125 ZZHC RX 250: Performed by: REGISTERED NURSE

## 2019-11-14 PROCEDURE — 27210794 ZZH OR GENERAL SUPPLY STERILE: Performed by: SURGERY

## 2019-11-14 PROCEDURE — 25000128 H RX IP 250 OP 636: Performed by: ANESTHESIOLOGY

## 2019-11-14 PROCEDURE — 88304 TISSUE EXAM BY PATHOLOGIST: CPT | Performed by: SURGERY

## 2019-11-14 PROCEDURE — 40000170 ZZH STATISTIC PRE-PROCEDURE ASSESSMENT II: Performed by: SURGERY

## 2019-11-14 PROCEDURE — 88304 TISSUE EXAM BY PATHOLOGIST: CPT | Mod: 26 | Performed by: SURGERY

## 2019-11-14 PROCEDURE — 71000012 ZZH RECOVERY PHASE 1 LEVEL 1 FIRST HR: Performed by: SURGERY

## 2019-11-14 DEVICE — MESH PROGRIP 14X9CM PARIETEX LEFT TEM1409GL: Type: IMPLANTABLE DEVICE | Site: INGUINAL | Status: FUNCTIONAL

## 2019-11-14 RX ORDER — SODIUM CHLORIDE, SODIUM LACTATE, POTASSIUM CHLORIDE, CALCIUM CHLORIDE 600; 310; 30; 20 MG/100ML; MG/100ML; MG/100ML; MG/100ML
INJECTION, SOLUTION INTRAVENOUS CONTINUOUS
Status: DISCONTINUED | OUTPATIENT
Start: 2019-11-14 | End: 2019-11-14 | Stop reason: HOSPADM

## 2019-11-14 RX ORDER — FENTANYL CITRATE 50 UG/ML
25-50 INJECTION, SOLUTION INTRAMUSCULAR; INTRAVENOUS
Status: DISCONTINUED | OUTPATIENT
Start: 2019-11-14 | End: 2019-11-14 | Stop reason: HOSPADM

## 2019-11-14 RX ORDER — DEXAMETHASONE SODIUM PHOSPHATE 4 MG/ML
4 INJECTION, SOLUTION INTRA-ARTICULAR; INTRALESIONAL; INTRAMUSCULAR; INTRAVENOUS; SOFT TISSUE EVERY 10 MIN PRN
Status: DISCONTINUED | OUTPATIENT
Start: 2019-11-14 | End: 2019-11-14 | Stop reason: HOSPADM

## 2019-11-14 RX ORDER — HYDROMORPHONE HYDROCHLORIDE 1 MG/ML
.3-.5 INJECTION, SOLUTION INTRAMUSCULAR; INTRAVENOUS; SUBCUTANEOUS EVERY 10 MIN PRN
Status: DISCONTINUED | OUTPATIENT
Start: 2019-11-14 | End: 2019-11-14 | Stop reason: HOSPADM

## 2019-11-14 RX ORDER — MAGNESIUM HYDROXIDE 1200 MG/15ML
LIQUID ORAL PRN
Status: DISCONTINUED | OUTPATIENT
Start: 2019-11-14 | End: 2019-11-14 | Stop reason: HOSPADM

## 2019-11-14 RX ORDER — OXYCODONE HYDROCHLORIDE 5 MG/1
5 TABLET ORAL
Status: COMPLETED | OUTPATIENT
Start: 2019-11-14 | End: 2019-11-14

## 2019-11-14 RX ORDER — LIDOCAINE HYDROCHLORIDE 10 MG/ML
INJECTION, SOLUTION EPIDURAL; INFILTRATION; INTRACAUDAL; PERINEURAL
Status: DISCONTINUED
Start: 2019-11-14 | End: 2019-11-14 | Stop reason: HOSPADM

## 2019-11-14 RX ORDER — OXYCODONE HYDROCHLORIDE 5 MG/1
5 TABLET ORAL EVERY 4 HOURS PRN
Qty: 20 TABLET | Refills: 0 | Status: SHIPPED | OUTPATIENT
Start: 2019-11-14 | End: 2021-11-09

## 2019-11-14 RX ORDER — LABETALOL HYDROCHLORIDE 5 MG/ML
INJECTION, SOLUTION INTRAVENOUS PRN
Status: DISCONTINUED | OUTPATIENT
Start: 2019-11-14 | End: 2019-11-14

## 2019-11-14 RX ORDER — ONDANSETRON 4 MG/1
4 TABLET, ORALLY DISINTEGRATING ORAL EVERY 30 MIN PRN
Status: DISCONTINUED | OUTPATIENT
Start: 2019-11-14 | End: 2019-11-14 | Stop reason: HOSPADM

## 2019-11-14 RX ORDER — GLYCOPYRROLATE 0.2 MG/ML
INJECTION, SOLUTION INTRAMUSCULAR; INTRAVENOUS PRN
Status: DISCONTINUED | OUTPATIENT
Start: 2019-11-14 | End: 2019-11-14

## 2019-11-14 RX ORDER — MEPERIDINE HYDROCHLORIDE 25 MG/ML
12.5 INJECTION INTRAMUSCULAR; INTRAVENOUS; SUBCUTANEOUS
Status: DISCONTINUED | OUTPATIENT
Start: 2019-11-14 | End: 2019-11-14 | Stop reason: HOSPADM

## 2019-11-14 RX ORDER — ONDANSETRON 2 MG/ML
4 INJECTION INTRAMUSCULAR; INTRAVENOUS EVERY 30 MIN PRN
Status: DISCONTINUED | OUTPATIENT
Start: 2019-11-14 | End: 2019-11-14 | Stop reason: HOSPADM

## 2019-11-14 RX ORDER — FENTANYL CITRATE 50 UG/ML
INJECTION, SOLUTION INTRAMUSCULAR; INTRAVENOUS PRN
Status: DISCONTINUED | OUTPATIENT
Start: 2019-11-14 | End: 2019-11-14

## 2019-11-14 RX ORDER — CEFAZOLIN SODIUM 2 G/100ML
2 INJECTION, SOLUTION INTRAVENOUS
Status: COMPLETED | OUTPATIENT
Start: 2019-11-14 | End: 2019-11-14

## 2019-11-14 RX ORDER — KETOROLAC TROMETHAMINE 30 MG/ML
30 INJECTION, SOLUTION INTRAMUSCULAR; INTRAVENOUS EVERY 6 HOURS PRN
Status: DISCONTINUED | OUTPATIENT
Start: 2019-11-14 | End: 2019-11-14 | Stop reason: HOSPADM

## 2019-11-14 RX ORDER — NALOXONE HYDROCHLORIDE 0.4 MG/ML
.1-.4 INJECTION, SOLUTION INTRAMUSCULAR; INTRAVENOUS; SUBCUTANEOUS
Status: DISCONTINUED | OUTPATIENT
Start: 2019-11-14 | End: 2019-11-14 | Stop reason: HOSPADM

## 2019-11-14 RX ORDER — KETOROLAC TROMETHAMINE 30 MG/ML
30 INJECTION, SOLUTION INTRAMUSCULAR; INTRAVENOUS ONCE
Status: DISCONTINUED | OUTPATIENT
Start: 2019-11-14 | End: 2019-11-14 | Stop reason: HOSPADM

## 2019-11-14 RX ORDER — NEOSTIGMINE METHYLSULFATE 1 MG/ML
VIAL (ML) INJECTION PRN
Status: DISCONTINUED | OUTPATIENT
Start: 2019-11-14 | End: 2019-11-14

## 2019-11-14 RX ORDER — LIDOCAINE HYDROCHLORIDE 20 MG/ML
INJECTION, SOLUTION INFILTRATION; PERINEURAL PRN
Status: DISCONTINUED | OUTPATIENT
Start: 2019-11-14 | End: 2019-11-14

## 2019-11-14 RX ORDER — LABETALOL HYDROCHLORIDE 5 MG/ML
10 INJECTION, SOLUTION INTRAVENOUS
Status: DISCONTINUED | OUTPATIENT
Start: 2019-11-14 | End: 2019-11-14 | Stop reason: HOSPADM

## 2019-11-14 RX ORDER — BUPIVACAINE HYDROCHLORIDE AND EPINEPHRINE 2.5; 5 MG/ML; UG/ML
INJECTION, SOLUTION EPIDURAL; INFILTRATION; INTRACAUDAL; PERINEURAL
Status: DISCONTINUED
Start: 2019-11-14 | End: 2019-11-14 | Stop reason: HOSPADM

## 2019-11-14 RX ORDER — CEFAZOLIN SODIUM 1 G/3ML
INJECTION, POWDER, FOR SOLUTION INTRAMUSCULAR; INTRAVENOUS PRN
Status: DISCONTINUED | OUTPATIENT
Start: 2019-11-14 | End: 2019-11-14

## 2019-11-14 RX ORDER — DEXAMETHASONE SODIUM PHOSPHATE 4 MG/ML
INJECTION, SOLUTION INTRA-ARTICULAR; INTRALESIONAL; INTRAMUSCULAR; INTRAVENOUS; SOFT TISSUE PRN
Status: DISCONTINUED | OUTPATIENT
Start: 2019-11-14 | End: 2019-11-14

## 2019-11-14 RX ORDER — PROPOFOL 10 MG/ML
INJECTION, EMULSION INTRAVENOUS PRN
Status: DISCONTINUED | OUTPATIENT
Start: 2019-11-14 | End: 2019-11-14

## 2019-11-14 RX ORDER — HYDRALAZINE HYDROCHLORIDE 20 MG/ML
2.5-5 INJECTION INTRAMUSCULAR; INTRAVENOUS EVERY 10 MIN PRN
Status: DISCONTINUED | OUTPATIENT
Start: 2019-11-14 | End: 2019-11-14 | Stop reason: HOSPADM

## 2019-11-14 RX ORDER — CEFAZOLIN SODIUM 1 G/3ML
1 INJECTION, POWDER, FOR SOLUTION INTRAMUSCULAR; INTRAVENOUS SEE ADMIN INSTRUCTIONS
Status: DISCONTINUED | OUTPATIENT
Start: 2019-11-14 | End: 2019-11-14 | Stop reason: HOSPADM

## 2019-11-14 RX ORDER — ACETAMINOPHEN 325 MG/1
650 TABLET ORAL
Status: DISCONTINUED | OUTPATIENT
Start: 2019-11-14 | End: 2019-11-14 | Stop reason: HOSPADM

## 2019-11-14 RX ADMIN — SODIUM CHLORIDE, POTASSIUM CHLORIDE, SODIUM LACTATE AND CALCIUM CHLORIDE: 600; 310; 30; 20 INJECTION, SOLUTION INTRAVENOUS at 13:22

## 2019-11-14 RX ADMIN — DEXAMETHASONE SODIUM PHOSPHATE 4 MG: 4 INJECTION, SOLUTION INTRA-ARTICULAR; INTRALESIONAL; INTRAMUSCULAR; INTRAVENOUS; SOFT TISSUE at 12:03

## 2019-11-14 RX ADMIN — OXYCODONE HYDROCHLORIDE 5 MG: 5 TABLET ORAL at 15:07

## 2019-11-14 RX ADMIN — FENTANYL CITRATE 50 MCG: 0.05 INJECTION, SOLUTION INTRAMUSCULAR; INTRAVENOUS at 14:36

## 2019-11-14 RX ADMIN — DEXMEDETOMIDINE HYDROCHLORIDE 4 MCG: 100 INJECTION, SOLUTION INTRAVENOUS at 12:21

## 2019-11-14 RX ADMIN — PHENYLEPHRINE HYDROCHLORIDE 100 MCG: 10 INJECTION INTRAVENOUS at 11:52

## 2019-11-14 RX ADMIN — KETOROLAC TROMETHAMINE 30 MG: 30 INJECTION, SOLUTION INTRAMUSCULAR at 14:49

## 2019-11-14 RX ADMIN — HYDROMORPHONE HYDROCHLORIDE 0.5 MG: 1 INJECTION, SOLUTION INTRAMUSCULAR; INTRAVENOUS; SUBCUTANEOUS at 14:44

## 2019-11-14 RX ADMIN — NEOSTIGMINE METHYLSULFATE 4.5 MG: 1 INJECTION, SOLUTION INTRAVENOUS at 13:52

## 2019-11-14 RX ADMIN — MIDAZOLAM 2 MG: 1 INJECTION INTRAMUSCULAR; INTRAVENOUS at 11:33

## 2019-11-14 RX ADMIN — PROPOFOL 200 MG: 10 INJECTION, EMULSION INTRAVENOUS at 11:33

## 2019-11-14 RX ADMIN — CEFAZOLIN 1 G: 1 INJECTION, POWDER, FOR SOLUTION INTRAMUSCULAR; INTRAVENOUS at 13:45

## 2019-11-14 RX ADMIN — GLYCOPYRROLATE 0.7 MG: 0.2 INJECTION, SOLUTION INTRAMUSCULAR; INTRAVENOUS at 13:52

## 2019-11-14 RX ADMIN — ACETAMINOPHEN 650 MG: 325 TABLET, FILM COATED ORAL at 15:08

## 2019-11-14 RX ADMIN — LIDOCAINE HYDROCHLORIDE 100 MG: 20 INJECTION, SOLUTION INFILTRATION; PERINEURAL at 11:33

## 2019-11-14 RX ADMIN — SODIUM CHLORIDE, POTASSIUM CHLORIDE, SODIUM LACTATE AND CALCIUM CHLORIDE: 600; 310; 30; 20 INJECTION, SOLUTION INTRAVENOUS at 11:30

## 2019-11-14 RX ADMIN — ROCURONIUM BROMIDE 50 MG: 10 INJECTION INTRAVENOUS at 11:33

## 2019-11-14 RX ADMIN — FENTANYL CITRATE 100 MCG: 50 INJECTION, SOLUTION INTRAMUSCULAR; INTRAVENOUS at 12:08

## 2019-11-14 RX ADMIN — DEXMEDETOMIDINE HYDROCHLORIDE 8 MCG: 100 INJECTION, SOLUTION INTRAVENOUS at 12:19

## 2019-11-14 RX ADMIN — PROPOFOL 100 MG: 10 INJECTION, EMULSION INTRAVENOUS at 12:25

## 2019-11-14 RX ADMIN — CEFAZOLIN SODIUM 2 G: 2 INJECTION, SOLUTION INTRAVENOUS at 11:45

## 2019-11-14 RX ADMIN — ROCURONIUM BROMIDE 10 MG: 10 INJECTION INTRAVENOUS at 12:16

## 2019-11-14 RX ADMIN — DEXMEDETOMIDINE HYDROCHLORIDE 8 MCG: 100 INJECTION, SOLUTION INTRAVENOUS at 12:20

## 2019-11-14 RX ADMIN — LABETALOL HYDROCHLORIDE 10 MG: 5 INJECTION INTRAVENOUS at 12:34

## 2019-11-14 RX ADMIN — FENTANYL CITRATE 100 MCG: 50 INJECTION, SOLUTION INTRAMUSCULAR; INTRAVENOUS at 11:33

## 2019-11-14 ASSESSMENT — MIFFLIN-ST. JEOR: SCORE: 1748

## 2019-11-14 ASSESSMENT — LIFESTYLE VARIABLES: TOBACCO_USE: 0

## 2019-11-14 NOTE — DISCHARGE INSTRUCTIONS
[facility]  Discharge Instructions: Post-Operative Laparoscopic Cholecystectomy and Open Inguinal Hernia Repair    ACTIVITY    Expect to feel tired after your surgery.  This will gradually resolve.      Take frequent, short walks and increase your activity gradually.      Avoid strenuous physical activity or heavy lifting greater than 15-20 lbs. for 2-3 weeks.  You may climb stairs.    You may drive without restrictions when you are not using any prescription pain medication and feel comfortable in a car.    You may return to work/school when you are comfortable without any prescription pain medication.    WOUND CARE    You may remove your outer dressing or Band-Aids and shower 48 hours after the surgery.  Pat your incisions dry and leave them open to air.  Re-apply dressing (Band-Aids or gauze/tape) as needed for comfort or drainage.    You may have steri-strips (looks like white tape) on your incision.  You may peel off the steri-strips 2 weeks after your surgery if they have not peeled off on their own.     Do not soak your incisions in a tub or pool for 2 weeks.     Do not apply any lotions, creams, or ointments to your incisions.    A ridge under your incisions is normal and will gradually resolve.    DIET    Start with liquids, then gradually resume your regular diet as tolerated.  Avoid heavy, spicy, and greasy meals for 2-3 days.    Drink plenty of fluids to stay hydrated.    It is not uncommon to experience some loose stools or diarrhea after surgery.  This is your body s way of adapting to the bile which will slowly drain into your intestine.  A low fat diet may help with this.  This should improve over 1-2 months.    PAIN    Expect some tenderness and discomfort at the incision sites.  Use the prescribed pain medication at your discretion.  Expect gradual resolution of your pain over several days.    You may take ibuprofen with food (unless you have been told not  to) instead of or in addition to your prescribed pain medication.  If you are taking Norco or Percocet, do not take any additional acetaminophen/APAP/Tylenol.    Do not drink alcohol or drive while you are taking pain medications.    You may apply ice to your incisions in 20 minute intervals as needed for the next 48 hours.  After that time, consider switching to heat if you prefer.    EXPECTATIONS    Pain medications can cause constipation.  Limit use when possible.  Take over the counter stool softener/stimulant, such as Colace or Senna, 1-2 times a day with plenty of water.  You may take a mild over the counter laxative, such as Miralax or a suppository, as needed.  You may discontinue these medications once you are having regular bowel movements and/or are no longer taking your narcotic pain medication.      You may have shoulder or upper back discomfort due to the gas used in surgery.  This is temporary and should resolve in 48-72 hours.  Short, frequent walks may help with this.    FOLLOW UP    Our office will contact you approximately 2 weeks to check on your progress and answer any questions you may have.  If you are doing well, you will not need to return for a follow up appointment.  If any concerns are identified over the phone, we will help you make an appointment to see a provider.     If you have not received a phone call, have any questions or concerns, or would like to be seen, please call us at [phone] and ask to speak with our nurse.  We are located at [address] [place] [address].    CALL OUR OFFICE -[phone] IF YOU HAVE:     Chills or fever above 101 F.    Increased redness, warmth, or drainage at your incisions.    Significant bleeding.    Pain not relieved by your pain medication or rest.    Increasing pain after the first 48 hours.    Any other concerns or questions.    Revised January 2018    Same Day Surgery Discharge Instructions for  Sedation and General  Anesthesia       It's not unusual to feel dizzy, light-headed or faint for up to 24 hours after surgery or while taking pain medication.  If you have these symptoms: sit for a few minutes before standing and have someone assist you when you get up to walk or use the bathroom.      You should rest and relax for the next 24 hours. We recommend you make arrangements to have an adult stay with you for at least 24 hours after your discharge.  Avoid hazardous and strenuous activity.      DO NOT DRIVE any vehicle or operate mechanical equipment for 24 hours following the end of your surgery.  Even though you may feel normal, your reactions may be affected by the medication you have received.      Do not drink alcoholic beverages for 24 hours following surgery.       Slowly progress to your regular diet as you feel able. It's not unusual to feel nauseated and/or vomit after receiving anesthesia.  If you develop these symptoms, drink clear liquids (apple juice, ginger ale, broth, 7-up, etc. ) until you feel better.  If your nausea and vomiting persists for 24 hours, please notify your surgeon.        All narcotic pain medications, along with inactivity and anesthesia, can cause constipation. Drinking plenty of liquids and increasing fiber intake will help.      For any questions of a medical nature, call your surgeon.      Do not make important decisions for 24 hours.      If you had general anesthesia, you may have a sore throat for a couple of days related to the breathing tube used during surgery.  You may use Cepacol lozenges to help with this discomfort.  If it worsens or if you develop a fever, contact your surgeon.       If you feel your pain is not well managed with the pain medications prescribed by your surgeon, please contact your surgeon's office to let them know so they can address your concerns.       Today you received Toradol, an antiinflammatory medication similar to Ibuprofen.  You should not take other  antiinflammatory medication, such as Ibuprofen, Motrin, Advil, Aleve, Naprosyn, etc until 8:50 p.m.     Today you were given 650 mg of Tylenol at 3:08 p.m. The recommended daily maximum dose is 4000 mg.

## 2019-11-14 NOTE — ANESTHESIA PREPROCEDURE EVALUATION
Anesthesia Pre-Procedure Evaluation    Patient: Lb Cevallos   MRN: [id number] : 1965          Preoperative Diagnosis: Cholecystitis [K81.9]    Procedure(s):  CHOLECYSTECTOMY, LAPAROSCOPIC  HERNIORRHAPHY, INGUINAL, OPEN    Past Medical History:   Diagnosis Date     Hyperlipidemia LDL goal <130 3/2/2011     Hypertrophy (benign) of prostate     mild, evaluated by UROLOGY     Kidney donor     left     Overweight(278.02) 2010     Past Surgical History:   Procedure Laterality Date     HC REPAIR SLIDING INGUINAL HERNIA  ,    Bilateral     HC VASECTOMY UNILAT/BILAT W POSTOP SEMEN       SURGICAL HISTORY OF -       donated left kidney, done with laparoscopic surg ([place])       Anesthesia Evaluation     . Pt has had prior anesthetic.     No history of anesthetic complications          ROS/MED HX    ENT/Pulmonary:      (-) tobacco use, asthma and sleep apnea   Neurologic:  - neg neurologic ROS     Cardiovascular:     (+) Dyslipidemia, ----. : . . . :. .      (-) hypertension   METS/Exercise Tolerance:     Hematologic:  - neg hematologic  ROS       Musculoskeletal:         GI/Hepatic:     (+) cholecystitis/cholelithiasis,      (-) GERD   Renal/Genitourinary:     (+) BPH, Other Renal/ Genitourinary, single kidney - kidney donor      Endo:      (-) Type II DM   Psychiatric:         Infectious Disease:         Malignancy:         Other:                          Physical Exam  Normal systems: cardiovascular and pulmonary    Airway   Mallampati: II  TM distance: >3 FB  Neck ROM: full    Dental   (+) caps    Cardiovascular       Pulmonary             Lab Results   Component Value Date    WBC 11.6 (H) 10/28/2019    HGB 16.8 10/28/2019    HCT 49.2 10/28/2019     10/28/2019     10/25/2019    POTASSIUM 4.1 10/25/2019    CHLORIDE 103 10/25/2019    CO2 27 10/25/2019    BUN 13 10/25/2019    CR 0.94 10/25/2019     (H) 10/25/2019    LEOLA 8.7 10/25/2019    PHOS 3.1 2005    MAG  "2.2 07/25/2005    ALBUMIN 3.9 10/25/2019    PROTTOTAL 7.6 10/25/2019    ALT 39 10/25/2019    AST 13 10/25/2019    GGT 42 07/25/2005    ALKPHOS 91 10/25/2019    BILITOTAL 0.9 10/25/2019    LIPASE 154 10/25/2019    PTT 27 07/25/2005    INR 1.04 07/25/2005    TSH 1.50 10/25/2019       Preop Vitals  BP Readings from Last 3 Encounters:   11/14/19 (!) 170/96   11/06/19 124/70   11/05/19 120/78    Pulse Readings from Last 3 Encounters:   11/06/19 80   11/05/19 78   10/28/19 88      Resp Readings from Last 3 Encounters:   11/14/19 18   06/15/18 14   03/04/16 16    SpO2 Readings from Last 3 Encounters:   11/14/19 98%   11/06/19 95%   10/28/19 97%      Temp Readings from Last 1 Encounters:   11/14/19 36.6  C (97.8  F) (Oral)    Ht Readings from Last 1 Encounters:   11/14/19 1.778 m (5' 10\")      Wt Readings from Last 1 Encounters:   11/14/19 90.2 kg (198 lb 12.8 oz)    Estimated body mass index is 28.52 kg/m  as calculated from the following:    Height as of this encounter: 1.778 m (5' 10\").    Weight as of this encounter: 90.2 kg (198 lb 12.8 oz).       Anesthesia Plan      History & Physical Review  History and physical reviewed and following examination; no interval change.    ASA Status:  2 .    NPO Status:  > 8 hours    Plan for General and ETT with Intravenous and Propofol induction. Maintenance will be Balanced.    PONV prophylaxis:  Ondansetron (or other 5HT-3) and Dexamethasone or Solumedrol  Additional equipment: Videolaryngoscope      Postoperative Care  Postoperative pain management:  Multi-modal analgesia.      Consents  Anesthetic plan, risks, benefits and alternatives discussed with:  Patient..                 Long Wang MD  "

## 2019-11-14 NOTE — ANESTHESIA CARE TRANSFER NOTE
Patient: Lb Cevallos    Procedure(s):  CHOLECYSTECTOMY, LAPAROSCOPIC  OPEN LEFT INGUINAL HERNIA REPAIR    Diagnosis: Cholecystitis [K81.9]  Diagnosis Additional Information: No value filed.    Anesthesia Type:   General, ETT     Note:  Airway :Face Mask  Patient transferred to:PACU  Comments: Neuromuscular blockade reversed after TOF 4/4, spontaneous respirations, adequate tidal volumes, oropharynx suctioned with soft flexible catheter, extubated atraumatically, extubated with suction, airway patent after extubation.  Oxygen via facemask at 6 liters per minute to PACU. Oxygen tubing connected to wall O2 in PACU, SpO2, NiBP, and EKG monitors and alarms on and functioning, Kalyn Hugger warmer connected to patient gown, report on patient's clinical status given to PACU RN, RN questions answered. Handoff Report: Identifed the Patient, Identified the Reponsible Provider, Reviewed the pertinent medical history, Discussed the surgical course, Reviewed Intra-OP anesthesia mangement and issues during anesthesia, Set expectations for post-procedure period and Allowed opportunity for questions and acknowledgement of understanding      Vitals: (Last set prior to Anesthesia Care Transfer)    CRNA VITALS  11/14/2019 1340 - 11/14/2019 1418      11/14/2019             Pulse:  74    SpO2:  100 %    Resp Rate (observed):  (!) 2    Resp Rate (set):  10                Electronically Signed By: CORDELL Santos CRNA  November 14, 2019  2:18 PM

## 2019-11-14 NOTE — OP NOTE
General Surgery Operative Note    PREOPERATIVE DIAGNOSIS:  Cholecystitis and left inguinal hernia    POSTOPERATIVE DIAGNOSIS: Cholecystitis and left inguinal hernia    PROCEDURE:  Laparoscopic Cholecystectomy and laparoscopic lysis of adhesions.         Open repair left inguinal hernia with mesh    SURGEON:  Ponce Hernandez M.D.    ASSISTANT:  Derrick Abreu M.D.    ANESTHESIA:  General.    BLOOD LOSS: 10 cc    FINDINGS: Significant adhesions in lower pelvis prohibiting laparoscopic hernia repair. Severe chronic cholecystitis.    INDICATIONS:   Mr. Cevallos presented with cholecystitis and left inguinal hernia and is now presenting for laparoscopic cholecystectomy and hernia repair. I explained the risks, benefits, complications including but not limited to bleeding, infection, possible need to open, possible postop hematoma, seroma, bowel, bladder or bile duct injury, MI, PE, and if any of these occurred the patient would require additional procedures. The patient agreed and did sign consent.    DETAILS OF PROCEDURE: The patient was brought to the operating room per Anesthesia, placed in supine position, and intubated without difficulty. A Surgical timeout was then performed, verifying the correct surgeon, site, procedure, and patient and all in the room were in agreement. 1% lidocaine and 0.25% were injected into infraumbilical area. A skin incision was made and was carried down to the anterior fascia.  The preperitoneal plane was attempted to be entered but there was significant scar tissue which prolong this dissection.  Eventually I was able to get into thepreperitoneal plane and placed the dissecting balloon in the space.  It was inflated under direct visualization but due to severe scarring I was unable to open the space adequately enough to perform a laparoscopic repair.  This was aborted and attention was turned to getting intra-abdominally for the laparoscopic cholecystectomy.  This was done through the  same incision. An open Navid technique was used to get intra-abdominal cavity. The camera was inserted and revealed no trocar injuries but did reveal significant pelvic adhesions.  I was able to place a trocar past the adhesions and get into the right upper quadrant which was devoid of severe adhesions.  Local was injected to the upper abdomen and 3  5's were placed in the subxiphoid and right upper quadrant under direct visualization. The gallbladder was significantly inflamed and stuck to the surrounding tissue.  Lysis of adhesions was performed to clear off the surrounding omentum to allow retraction of the gallbladder.  It was then retracted superiorly and laterally.  Cautery was used to take down the peritoneal attachments and the dissection was severely prolonged due to severe inflammation surrounding the gallbladder.  Blunt dissection was used for significant amount of time to delineate the artery and duct. . This was taken up high to confirm the critical view on multiple views. Once I was comfortable that there was 1 duct and 1 artery going straight into the gallbladder, the duct was further cleared but appeared to be too large to accept the clip.   The artery was then clipped 2 times proximally and one distally and cut with scissors. . The gallbladder was taken off the liver bed with cautery with moderate difficulty.  There was significant adhesions in the gallbladder was extremely friable and intrahepatic in nature.. There was some bile spillage but no significant bleeding. The gallbladder was then placed in an Endo catch bag and removed through the umbilical trocar site. The camera was reinserted which showed no bleeding or bile spillage. Copious irrigation was used until clear. The wound bed was inspected multiple times showing that it was completely dry and that the clips were in place. The omentum was packed into the perihepatic fossa. All gas was expelled and the trocars were taken out under direct  visualization. The fascia closed with 0 Vicryl in figure-of-eight fashion. Marcaine 0.25% were injected to all the wounds. The skin was closed with a 4-0 Monocryl in a subcuticular fashion. Steri-strips and sterile dressings were applied.    Clean towels and instruments were then used to make  a standard groin incision.  It was carried down to the external oblique fascia with cautery. This was sharply incised and the inguinal canal was exposed. The spermatic cord and its contents were mobilized and encircled with a Penrose drain. A large sized direct and indirect hernia was present. The sac was meticulously teased off of the spermatic cord. It was easily reduced. The direct floor was re approximated with multiple 0 Vicryl's in interrupted fashion to contain the direct hernia. A medium sized left-sided Pro  was placed in the usual fashion. The keyhole was fashioned around the spermatic cord without difficulty. It was anchored at the pubic tubercle with an 0 Vicryl to help eliminate movement during placement. It was tucked under the external fascia very nicely. There was good coverage over the entire hernia. The external oblique was closed using running 3.0 Vicryl stitch. The operative area was infiltrated with local anesthetic. The deep subcutaneous was closed with 3-0 Vicryl stitch. Skin incision was closed with subcuticular 4-0 Monocryl stitch. Steri-Strips were applied. Needle and sponge counts were correct. The patient tolerated the procedure well and was taken from the operating room to the recovery room in stable condition. The physician assistant was medically necessary to provide adequate exposure of the operating field, maintain hemostasis, clamping and ligating blood vessels, and visualization of anatomic structures throughout the surgical procedure    BLU BAZAN MD     Please route or send letter to:  Primary Care Provider (PCP) and Referring Provider

## 2019-11-14 NOTE — ANESTHESIA POSTPROCEDURE EVALUATION
Patient: Lb Cevallos    Procedure(s):  CHOLECYSTECTOMY, LAPAROSCOPIC  OPEN LEFT INGUINAL HERNIA REPAIR    Diagnosis:Cholecystitis [K81.9]  Diagnosis Additional Information: No value filed.    Anesthesia Type:  General, ETT    Note:  Anesthesia Post Evaluation    Patient location during evaluation: PACU  Patient participation: Able to fully participate in evaluation  Level of consciousness: awake  Pain management: adequate  Airway patency: patent  Cardiovascular status: acceptable  Respiratory status: acceptable  Hydration status: acceptable  PONV: none     Anesthetic complications: None          Last vitals:  Vitals:    11/14/19 1415 11/14/19 1430 11/14/19 1436   BP: (!) 140/93 (!) 146/88    Pulse: 76     Resp: 13 12 14   Temp: 36.4  C (97.5  F) 36.8  C (98.2  F)    SpO2: 100% 100% 100%         Electronically Signed By: Dianna Carvajal  November 14, 2019  2:44 PM

## 2019-11-15 LAB — COPATH REPORT: NORMAL

## 2019-11-22 ENCOUNTER — TELEPHONE (OUTPATIENT)
Dept: SURGERY | Facility: CLINIC | Age: 54
End: 2019-11-22

## 2019-11-22 NOTE — TELEPHONE ENCOUNTER
[facility]  Post op call note  November 22, 2019       Lb HUNT Gurmeetavery was called for an update regarding his recovery.  There was no answer and a message was left encouraging him to call us back with any questions, concerns, or to provide an update.      Derrick Abreu MD  [facility]  [phone]

## 2019-12-09 ENCOUNTER — HEALTH MAINTENANCE LETTER (OUTPATIENT)
Age: 54
End: 2019-12-09

## 2021-01-14 ENCOUNTER — HEALTH MAINTENANCE LETTER (OUTPATIENT)
Age: 56
End: 2021-01-14

## 2021-10-08 ENCOUNTER — TELEPHONE (OUTPATIENT)
Dept: SURGERY | Facility: CLINIC | Age: 56
End: 2021-10-08

## 2021-10-08 NOTE — TELEPHONE ENCOUNTER
Had lap warner 11/14/2019 and is having a tugging sensation in the area that has not gone away and wanting to speak to someone about this    Phone: [phone]  Message ok

## 2021-10-24 ENCOUNTER — HEALTH MAINTENANCE LETTER (OUTPATIENT)
Age: 56
End: 2021-10-24

## 2021-11-09 ENCOUNTER — OFFICE VISIT (OUTPATIENT)
Dept: SURGERY | Facility: CLINIC | Age: 56
End: 2021-11-09
Payer: COMMERCIAL

## 2021-11-09 VITALS — HEART RATE: 83 BPM | DIASTOLIC BLOOD PRESSURE: 80 MMHG | SYSTOLIC BLOOD PRESSURE: 140 MMHG

## 2021-11-09 DIAGNOSIS — R10.32 LEFT GROIN PAIN: Primary | ICD-10-CM

## 2021-11-09 PROCEDURE — 99212 OFFICE O/P EST SF 10 MIN: CPT | Performed by: SURGERY

## 2021-11-09 NOTE — LETTER
November 11, 2021          Damián Parks MD  [address] [place] [address]      RE:   Lb Cevallos 1965      Dear Colleague,    Thank you for referring your patient, Lb Cevallos, to [facility] Please see a copy of my visit note below.    Lb returns to the office today for checkup after undergoing a laparoscopic cholecystectomy and open left inguinal hernia repair.  He states he recovered well but has continued to have some mild pain episodes in his left groin.  He describes these as more annoying and uncomfortable versus severe pain.  Usually does not need to take any analgesics and the pain will resolve.  He wanted to assure he did not have a recurrent hernia but states that he could easily live with his current symptoms.  He had no issues with the gallbladder portion of the surgery.  He has no other complaints today.     Abdomen Soft Non-Tender. Incisions healed  Groin-bilateral groin exam was performed in the sitting and supine position.  Left-incision healed without hernia.  No hernias palpated.  No significant tenderness palpated.  No obvious hernias palpated.  No pain with leg raise or sit up.  Right-no abnormal findings.     A/P  Lb Cevallos returns after undergoing laparoscopic cholecystectomy and open left inguinal hernia repair. He has had some ongoing left groin pain but no obvious abnormalities are apparent to describe the etiology.  He is very comfortable with the symptoms but wanted to assure he was not missing something.  We discussed further work-up which could include imaging or physical therapy.  Likely, this is just some irritation from the mesh and could potential last a long time.  He is very comfortable with observation at this time.  I advised him to call the office to schedule imaging studies if his pain becomes worse.  He is in agreement.    Again, thank you for allowing me to participate in the care of your  patient.      Sincerely,      Ponce Hernandez MD

## 2021-11-11 NOTE — PROGRESS NOTES
Surgery Note    Lb returns to the office today for checkup after undergoing a laparoscopic cholecystectomy and open left inguinal hernia repair.  He states he recovered well but has continued to have some mild pain episodes in his left groin.  He describes these as more annoying and uncomfortable versus severe pain.  Usually does not need to take any analgesics and the pain will resolve.  He wanted to assure he did not have a recurrent hernia but states that he could easily live with his current symptoms.  He had no issues with the gallbladder portion of the surgery.  He has no other complaints today.    Abdomen Soft Non-Tender. Incisions healed  Groin-bilateral groin exam was performed in the sitting and supine position.  Left-incision healed without hernia.  No hernias palpated.  No significant tenderness palpated.  No obvious hernias palpated.  No pain with leg raise or sit up.  Right-no abnormal findings.    A/P  Lb Cevallos returns after undergoing laparoscopic cholecystectomy and open left inguinal hernia repair. He has had some ongoing left groin pain but no obvious abnormalities are apparent to describe the etiology.  He is very comfortable with the symptoms but wanted to assure he was not missing something.  We discussed further work-up which could include imaging or physical therapy.  Likely, this is just some irritation from the mesh and could potential last a long time.  He is very comfortable with observation at this time.  I advised him to call the office to schedule imaging studies if his pain becomes worse.  He is in agreement.    Thank you for the opportunity to help in his care.    Ponce Hernandez M.D.  [facility]  [phone]    Please route or send letter to:  Primary Care Provider (PCP) and Referring Provider

## 2022-02-13 ENCOUNTER — HEALTH MAINTENANCE LETTER (OUTPATIENT)
Age: 57
End: 2022-02-13

## 2022-10-15 ENCOUNTER — HEALTH MAINTENANCE LETTER (OUTPATIENT)
Age: 57
End: 2022-10-15

## 2023-03-26 ENCOUNTER — HEALTH MAINTENANCE LETTER (OUTPATIENT)
Age: 58
End: 2023-03-26

## 2024-05-26 ENCOUNTER — HEALTH MAINTENANCE LETTER (OUTPATIENT)
Age: 59
End: 2024-05-26

## (undated) DEVICE — DRSG STERI STRIP 1/2X4" R1547

## (undated) DEVICE — PACK LAP CHOLE SLC15LCFSD

## (undated) DEVICE — ESU ELEC BLADE 2.75" COATED/INSULATED E1455

## (undated) DEVICE — SYR 10ML SLIP TIP W/O NDL

## (undated) DEVICE — ESU CORD MONOPOLAR 10'  E0510

## (undated) DEVICE — SOL NACL 0.9% INJ 1000ML BAG 2B1324X

## (undated) DEVICE — ENDO POUCH UNIV RETRIEVAL SYSTEM INZII 10MM CD001

## (undated) DEVICE — DRAIN PENROSE 0.25"X18" LATEX FREE GR201

## (undated) DEVICE — SU VICRYL 0 CT-2 27" J334H

## (undated) DEVICE — DRAPE LAP W/ARMBOARD 29410

## (undated) DEVICE — ENDO SCOPE WARMER LF TM500

## (undated) DEVICE — SU MONOCRYL 4-0 PS-2 18" UND Y496G

## (undated) DEVICE — ENDO TROCAR FIRST ENTRY KII FIOS Z-THRD 12X100MM CTF73

## (undated) DEVICE — SU VICRYL 0 UR-6 27" J603H

## (undated) DEVICE — SU SILK 2-0 SH 30" K833H

## (undated) DEVICE — BLADE CLIPPER 4406

## (undated) DEVICE — NDL 22GA 1.5"

## (undated) DEVICE — ESU PENCIL W/HOLSTER E2350H

## (undated) DEVICE — ENDO TROCAR SLEEVE KII Z-THREADED 05X100MM CTS02

## (undated) DEVICE — ESU HOLDER LAP INST DISP PURPLE LONG 330MM H-PRO-330

## (undated) DEVICE — PREP CHLORAPREP 26ML TINTED ORANGE  260815

## (undated) DEVICE — SU PDS II 0 ENDOLOOP EZ10G

## (undated) DEVICE — SOL WATER IRRIG 1000ML BOTTLE 2F7114

## (undated) DEVICE — ENDO TROCAR FIRST ENTRY KII FIOS Z-THRD 05X100MM CTF03

## (undated) DEVICE — NDL 19GA 1.5"

## (undated) DEVICE — SOL NACL 0.9% IRRIG 1000ML BOTTLE 2F7124

## (undated) DEVICE — GLOVE GAMMEX DERMAPRENE ULTRA SZ 8 LF 8516

## (undated) DEVICE — LINEN TOWEL PACK X5 5464

## (undated) DEVICE — SUCTION IRR STRYKERFLOW II W/TIP 250-070-520

## (undated) DEVICE — ENDO KIT DISSECT BALL SYS FIRST ENTRY KII FIOS ADV FIX C0K17

## (undated) DEVICE — GLOVE PROTEXIS W/NEU-THERA 7.5  2D73TE75

## (undated) DEVICE — CLIP APPLIER ENDO 5MM M/L LIGAMAX EL5ML

## (undated) DEVICE — SYR BULB IRRIG DOVER 60 ML LATEX FREE 67000

## (undated) DEVICE — SU VICRYL 2-0 SH 27" J317H

## (undated) DEVICE — ESU PENCIL W/SMOKE EVAC CVPLP2000

## (undated) DEVICE — SU VICRYL 3-0 SH 27" J316H

## (undated) RX ORDER — DEXAMETHASONE SODIUM PHOSPHATE 4 MG/ML
INJECTION, SOLUTION INTRA-ARTICULAR; INTRALESIONAL; INTRAMUSCULAR; INTRAVENOUS; SOFT TISSUE
Status: DISPENSED
Start: 2019-11-14

## (undated) RX ORDER — CEFAZOLIN SODIUM 1 G/3ML
INJECTION, POWDER, FOR SOLUTION INTRAMUSCULAR; INTRAVENOUS
Status: DISPENSED
Start: 2019-11-14

## (undated) RX ORDER — OXYCODONE HYDROCHLORIDE 5 MG/1
TABLET ORAL
Status: DISPENSED
Start: 2019-11-14

## (undated) RX ORDER — FENTANYL CITRATE 0.05 MG/ML
INJECTION, SOLUTION INTRAMUSCULAR; INTRAVENOUS
Status: DISPENSED
Start: 2019-11-14

## (undated) RX ORDER — ONDANSETRON 2 MG/ML
INJECTION INTRAMUSCULAR; INTRAVENOUS
Status: DISPENSED
Start: 2019-11-14

## (undated) RX ORDER — CEFAZOLIN SODIUM 2 G/100ML
INJECTION, SOLUTION INTRAVENOUS
Status: DISPENSED
Start: 2019-11-14

## (undated) RX ORDER — ACETAMINOPHEN 325 MG/1
TABLET ORAL
Status: DISPENSED
Start: 2019-11-14

## (undated) RX ORDER — KETOROLAC TROMETHAMINE 30 MG/ML
INJECTION, SOLUTION INTRAMUSCULAR; INTRAVENOUS
Status: DISPENSED
Start: 2019-11-14

## (undated) RX ORDER — PROPOFOL 10 MG/ML
INJECTION, EMULSION INTRAVENOUS
Status: DISPENSED
Start: 2019-11-14

## (undated) RX ORDER — LABETALOL HYDROCHLORIDE 5 MG/ML
INJECTION, SOLUTION INTRAVENOUS
Status: DISPENSED
Start: 2019-11-14

## (undated) RX ORDER — LIDOCAINE HYDROCHLORIDE 20 MG/ML
INJECTION, SOLUTION EPIDURAL; INFILTRATION; INTRACAUDAL; PERINEURAL
Status: DISPENSED
Start: 2019-11-14

## (undated) RX ORDER — FENTANYL CITRATE 50 UG/ML
INJECTION, SOLUTION INTRAMUSCULAR; INTRAVENOUS
Status: DISPENSED
Start: 2019-11-14

## (undated) RX ORDER — HYDROMORPHONE HYDROCHLORIDE 1 MG/ML
INJECTION, SOLUTION INTRAMUSCULAR; INTRAVENOUS; SUBCUTANEOUS
Status: DISPENSED
Start: 2019-11-14